# Patient Record
Sex: MALE | Race: BLACK OR AFRICAN AMERICAN | NOT HISPANIC OR LATINO | Employment: OTHER | URBAN - METROPOLITAN AREA
[De-identification: names, ages, dates, MRNs, and addresses within clinical notes are randomized per-mention and may not be internally consistent; named-entity substitution may affect disease eponyms.]

---

## 2024-03-12 DIAGNOSIS — Z12.11 ENCOUNTER FOR SCREENING FOR MALIGNANT NEOPLASM OF COLON: Primary | ICD-10-CM

## 2024-04-01 NOTE — PROGRESS NOTES
Clinic Note    Reason for visit:  The primary encounter diagnosis was Anemia, unspecified type. Diagnoses of HIV infection, unspecified symptom status and Encounter for screening for malignant neoplasm of colon were also pertinent to this visit.    PCP: Tavon Dumont   No address on file    HPI:  This is a 73 y.o. male here to be established. Referred for anemia. He states he had colonoscopy in 2015 at [facility] that was normal. Patient denies any reflux, dysphagia, abdominal pain, constipation, diarrhea, or blood in stool. He has h/o of laryngeal Ca s/p laryngectomy in 2007. He Is able to eat well. He states he has had anemia for years. Was put on iron at one point but no longer is on iron.    2/21/2024 WBC 4.1L Hgb 13.2L, MCV 97.2, CBC, CMP- nl  Patient w/ HIV followed by Dr. Mcdonald on [facility]    Review of Systems   Constitutional:  Negative for diaphoresis, fatigue, fever and unexpected weight change.   HENT:  Negative for hearing loss, mouth sores, postnasal drip, sore throat and trouble swallowing.    Eyes:  Negative for pain, discharge and eye dryness.   Respiratory:  Negative for apnea, cough, choking, chest tightness, shortness of breath and wheezing.    Cardiovascular:  Negative for chest pain, palpitations and leg swelling.   Gastrointestinal:  Negative for abdominal distention, abdominal pain, anal bleeding, blood in stool, change in bowel habit, constipation, diarrhea, nausea, rectal pain, vomiting, reflux and fecal incontinence.   Genitourinary:  Negative for bladder incontinence, dysuria and hematuria.   Musculoskeletal:  Negative for arthralgias, back pain and joint swelling.   Integumentary:  Negative for color change and rash.   Allergic/Immunologic: Negative for environmental allergies and food allergies.   Neurological:  Negative for seizures and headaches.   Hematological:  Negative for adenopathy. Does not bruise/bleed easily.        Past Medical History:   Diagnosis Date    Anemia,  unspecified     B12 deficiency     GERD (gastroesophageal reflux disease)     History of cancer of larynx 2007    History of cerebral infarction     HIV (human immunodeficiency virus infection)     HLD (hyperlipidemia)     HTN (hypertension)     Hypothyroidism, unspecified     Vitamin D deficiency      Past Surgical History:   Procedure Laterality Date    CATARACT EXTRACTION      LARYNGECTOMY  2007     Family History   Problem Relation Age of Onset    Cancer Mother         unsure what kind    Lung cancer Father     Colon cancer Neg Hx     Crohn's disease Neg Hx     Esophageal cancer Neg Hx     Irritable bowel syndrome Neg Hx     Liver cancer Neg Hx     Liver disease Neg Hx     Rectal cancer Neg Hx     Stomach cancer Neg Hx      Social History     Tobacco Use    Smoking status: Never    Smokeless tobacco: Never   Substance Use Topics    Alcohol use: Never    Drug use: Never     Review of patient's allergies indicates:  No Known Allergies     Medication List with Changes/Refills   New Medications    SODIUM,POTASSIUM,MAG SULFATES (SUPREP BOWEL PREP KIT) 17.5-3.13-1.6 GRAM SOLR    Take according to kit instructions but DO NOT eat breakfast the morning of procedure.   Current Medications    AMLODIPINE (NORVASC) 5 MG TABLET    Take 1 tablet by mouth once daily.    ASPIRIN 81 MG CHEW    81 mg.    CYANOCOBALAMIN-COBAMAMIDE 5,000-100 MCG LOZG    Place by sublingual route.    DORZOLAMIDE-TIMOLOL 2-0.5% (COSOPT) 22.3-6.8 MG/ML OPHTHALMIC SOLUTION    INSTILL 1 DROP IN BOTH EYES TWICE A DAY FOR GLAUCOMA    EMTRICITABINE-TENOFOVIR ALAFEN (DESCOVY) 200-25 MG TAB    Take 1 tablet by mouth once daily.    FOLIC ACID (FOLVITE) 1 MG TABLET    Take 1 tablet by mouth once daily.    GABAPENTIN (NEURONTIN) 800 MG TABLET    TK 1 T PO BID    LEVOTHYROXINE (SYNTHROID) 137 MCG TAB TABLET    137 mcg.    LOPINAVIR-RITONAVIR 400-100 MG/5 ML (KALETRA) 400-100 MG/5 ML SOLN    TAKE 5 ML BY MOUTH TWICE A DAY FOR VIRAL INFECTION    METOPROLOL TARTRATE  (LOPRESSOR) 50 MG TABLET    25 mg.    POTASSIUM CHLORIDE SA (K-DUR,KLOR-CON) 20 MEQ TABLET    40 mEq.    PRAVASTATIN (PRAVACHOL) 20 MG TABLET    20 mg.    TERAZOSIN (HYTRIN) 2 MG CAPSULE    4 mg.   Discontinued Medications    CHOLECALCIFEROL, VITAMIN D3, (VITAMIN D3) 50 MCG (2,000 UNIT) TAB    50 mcg.    CLOPIDOGREL BISULFATE (PLAVIX ORAL)            Vital Signs:  /75 (BP Location: Left arm, Patient Position: Sitting)   Pulse 64   Ht 6' (1.829 m)   Wt 83 kg (183 lb)   SpO2 96%   BMI 24.82 kg/m²        Physical Exam  Constitutional:       General: He is not in acute distress.     Appearance: Normal appearance. He is well-developed. He is not ill-appearing or toxic-appearing.      Comments: Ambulates with walker.  Aphonia   HENT:      Head: Normocephalic and atraumatic.      Nose: Nose normal.      Mouth/Throat:      Mouth: Mucous membranes are moist.      Pharynx: Oropharynx is clear. No oropharyngeal exudate or posterior oropharyngeal erythema.   Eyes:      General: Lids are normal. No scleral icterus.        Right eye: No discharge.         Left eye: No discharge.      Conjunctiva/sclera: Conjunctivae normal.   Cardiovascular:      Rate and Rhythm: Normal rate and regular rhythm.      Pulses:           Radial pulses are 2+ on the right side and 2+ on the left side.   Pulmonary:      Effort: Pulmonary effort is normal. No respiratory distress.      Breath sounds: No stridor. No wheezing.   Abdominal:      General: Bowel sounds are normal. There is no distension.      Palpations: Abdomen is soft. There is no fluid wave, hepatomegaly, splenomegaly or mass.      Tenderness: There is no abdominal tenderness. There is no guarding or rebound.   Musculoskeletal:      Cervical back: Full passive range of motion without pain.   Lymphadenopathy:      Cervical: No cervical adenopathy.   Skin:     General: Skin is warm and dry.      Capillary Refill: Capillary refill takes less than 2 seconds.      Coloration: Skin  is not cyanotic, jaundiced or pale.   Neurological:      General: No focal deficit present.      Mental Status: He is alert and oriented to person, place, and time.   Psychiatric:         Mood and Affect: Mood normal.         Behavior: Behavior is cooperative.            All of the data above and below has been reviewed by myself and any further interpretations will be reflected in the assessment and plan.   The data includes review of external notes, and independent interpretation of lab results, procedures, x-rays, and imaging reports.      Assessment:  Anemia, unspecified type  -     Ambulatory Referral to External Surgery  -     CBC Auto Differential; Future; Expected date: 04/02/2024  -     Ferritin; Future; Expected date: 04/02/2024  -     Folate; Future; Expected date: 04/02/2024  -     Hemoglobin Electrophoresis Evaluation, Blood; Future; Expected date: 04/02/2024  -     Iron, TIBC, and %Saturation; Future; Expected date: 04/02/2024  -     Lactate Dehydrogenase; Future; Expected date: 04/02/2024  -     Reticulocytes; Future; Expected date: 04/02/2024  -     Transferrin; Future; Expected date: 04/02/2024  -     sodium,potassium,mag sulfates (SUPREP BOWEL PREP KIT) 17.5-3.13-1.6 gram SolR; Take according to kit instructions but DO NOT eat breakfast the morning of procedure.  Dispense: 1 kit; Refill: 0    HIV infection, unspecified symptom status    Encounter for screening for malignant neoplasm of colon  -     Ambulatory referral/consult to Gastroenterology         Anemia- will complete bloodwork to evalute for iron def. Pt. On folate/b12 currently.  CRC screening- due in 2025. Referral for anemia to have colonoscopy earlier due to anemia.     Recommendations:    Schedule colonoscopy with Dr. Dickens  Submit bloodwork.    Risks, benefits, and alternatives of medical management, any associated procedures, and/or treatment discussed with the patient. Patient given opportunity to ask questions and voices  understanding. Patient has elected to proceed with the recommended care modalities as discussed.    Follow up if symptoms worsen or fail to improve.    Order summary:  Orders Placed This Encounter   Procedures    CBC Auto Differential    Ferritin    Folate    Hemoglobin Electrophoresis Evaluation, Blood    Iron, TIBC, and %Saturation    Lactate Dehydrogenase    Reticulocytes    Transferrin    Ambulatory Referral to External Surgery        Instructed patient to notify my office if they have not been contacted within two weeks after any procedures, submitting any samples (biopsies, blood, stool, urine, etc.) or after any imaging (X-ray, CT, MRI, etc.).      Quyen Mcdowell NP    This document may have been created using a voice recognition transcribing system. Incorrect words or phrases may have been missed during proofreading. Please interpret accordingly or contact me for clarification.

## 2024-04-02 ENCOUNTER — OFFICE VISIT (OUTPATIENT)
Dept: GASTROENTEROLOGY | Facility: CLINIC | Age: 74
End: 2024-04-02
Payer: OTHER GOVERNMENT

## 2024-04-02 ENCOUNTER — TELEPHONE (OUTPATIENT)
Dept: GASTROENTEROLOGY | Facility: CLINIC | Age: 74
End: 2024-04-02

## 2024-04-02 VITALS
SYSTOLIC BLOOD PRESSURE: 114 MMHG | OXYGEN SATURATION: 96 % | HEIGHT: 72 IN | BODY MASS INDEX: 24.79 KG/M2 | DIASTOLIC BLOOD PRESSURE: 75 MMHG | WEIGHT: 183 LBS | HEART RATE: 64 BPM

## 2024-04-02 DIAGNOSIS — D64.9 ANEMIA, UNSPECIFIED TYPE: Primary | ICD-10-CM

## 2024-04-02 DIAGNOSIS — B20 HIV INFECTION, UNSPECIFIED SYMPTOM STATUS: ICD-10-CM

## 2024-04-02 DIAGNOSIS — Z12.11 ENCOUNTER FOR SCREENING FOR MALIGNANT NEOPLASM OF COLON: ICD-10-CM

## 2024-04-02 DIAGNOSIS — D64.9 ANEMIA, UNSPECIFIED TYPE: ICD-10-CM

## 2024-04-02 PROCEDURE — 99204 OFFICE O/P NEW MOD 45 MIN: CPT | Mod: S$GLB,,, | Performed by: NURSE PRACTITIONER

## 2024-04-02 RX ORDER — METOPROLOL TARTRATE 50 MG/1
25 TABLET ORAL
COMMUNITY
Start: 2024-01-18

## 2024-04-02 RX ORDER — CHOLECALCIFEROL (VITAMIN D3) 50 MCG
50 TABLET ORAL
COMMUNITY
Start: 2024-02-28 | End: 2024-04-02

## 2024-04-02 RX ORDER — PRAVASTATIN SODIUM 20 MG/1
20 TABLET ORAL
COMMUNITY
Start: 2023-12-14

## 2024-04-02 RX ORDER — NAPROXEN SODIUM 220 MG/1
81 TABLET, FILM COATED ORAL
COMMUNITY
Start: 2024-02-28

## 2024-04-02 RX ORDER — DORZOLAMIDE HYDROCHLORIDE AND TIMOLOL MALEATE 20; 5 MG/ML; MG/ML
SOLUTION/ DROPS OPHTHALMIC
COMMUNITY
Start: 2023-08-31

## 2024-04-02 RX ORDER — GABAPENTIN 800 MG/1
TABLET ORAL
COMMUNITY

## 2024-04-02 RX ORDER — LEVOTHYROXINE SODIUM 137 UG/1
137 TABLET ORAL
COMMUNITY
Start: 2024-02-28

## 2024-04-02 RX ORDER — SODIUM, POTASSIUM,MAG SULFATES 17.5-3.13G
SOLUTION, RECONSTITUTED, ORAL ORAL
Qty: 1 KIT | Refills: 0 | Status: SHIPPED | OUTPATIENT
Start: 2024-04-02 | End: 2024-04-15 | Stop reason: SDUPTHER

## 2024-04-02 RX ORDER — FOLIC ACID 1 MG/1
1 TABLET ORAL DAILY
COMMUNITY
Start: 2024-02-28

## 2024-04-02 RX ORDER — POTASSIUM CHLORIDE 20 MEQ/1
40 TABLET, EXTENDED RELEASE ORAL
COMMUNITY
Start: 2024-02-28

## 2024-04-02 RX ORDER — AMLODIPINE BESYLATE 5 MG/1
1 TABLET ORAL DAILY
COMMUNITY
Start: 2024-02-28

## 2024-04-02 RX ORDER — SOD SULF/POT CHLORIDE/MAG SULF 1.479 G
TABLET ORAL
Qty: 24 TABLET | Refills: 0 | Status: CANCELLED | OUTPATIENT
Start: 2024-04-02

## 2024-04-02 RX ORDER — TERAZOSIN 2 MG/1
4 CAPSULE ORAL
COMMUNITY
Start: 2024-01-18

## 2024-04-02 RX ORDER — LOPINAVIR-RITONAVIR 80; 20 MG/ML; MG/ML
SOLUTION ORAL
COMMUNITY
Start: 2023-12-14

## 2024-04-02 NOTE — PATIENT INSTRUCTIONS
Schedule colonoscopy with Dr. Dickens  Submit bloodwork.    Please notify my office if you have not been contacted within two weeks after any procedures, submitting any samples (biopsies, blood, stool, urine, etc.) or after any imaging (X-ray, CT, MRI, etc.).

## 2024-04-02 NOTE — TELEPHONE ENCOUNTER
----- Message from Reny Angeles sent at 4/2/2024 11:18 AM CDT -----  Contact: Nayeli(wife)  Nayeli called to consult with nurse or staff regarding the patient. She states she wanted to notify the office the order for the prep for [facility]. She wanted to see if this can be mailed to them. She would like a call back regarding this and can be reached at [phone]. Thanks/MR

## 2024-04-02 NOTE — TELEPHONE ENCOUNTER
Spoke with patient's wife. She said that they need the prep medication to [facility] the [facility]. I asked for pharmacy information and she could not advise. She advised to just fax it back to the person from the [facility] who [facility] the referral and they will take care of it. That way it will be mailed to patient. DMP

## 2024-04-15 RX ORDER — SODIUM, POTASSIUM,MAG SULFATES 17.5-3.13G
SOLUTION, RECONSTITUTED, ORAL ORAL
Qty: 1 KIT | Refills: 0 | Status: SHIPPED | OUTPATIENT
Start: 2024-04-15

## 2024-08-01 ENCOUNTER — TELEPHONE (OUTPATIENT)
Dept: GASTROENTEROLOGY | Facility: CLINIC | Age: 74
End: 2024-08-01
Payer: OTHER GOVERNMENT

## 2024-08-01 VITALS — BODY MASS INDEX: 24.79 KG/M2 | HEIGHT: 72 IN | WEIGHT: 183 LBS

## 2024-08-01 DIAGNOSIS — D64.9 ANEMIA, UNSPECIFIED TYPE: Primary | ICD-10-CM

## 2024-08-01 NOTE — TELEPHONE ENCOUNTER
"S/w pt's wife and told her that I was calling as a courtesy regarding up coming Colon with NBP on 8/8/24, Thurs and wanted to verify that he has his paper prep instructions and meds. Pt's wife stated she has the instruction but just needs to pickup  the meds.  I also mentioned that [facility] will call the day before (WED) with the arrival time, GI Lab is located on the third floor, and to pre-register before next Wed. [facility]      There is a red bar across "SnapShot" that states pt is a neck breather and may only intubated/ventilated through the neck stoma. Call ENT with Questions regarding airway management. kj  "

## 2024-08-01 NOTE — TELEPHONE ENCOUNTER
"[place] [facility]  [address] [place] [address]  Phone: [phone]  Fax: [phone]    History & Physical         Provider: Dr. Tonja Dickens    Patient Name: Ky LEMUS (age):1950  74 y.o.           Gender: male   Phone: [phone]     Referring Physician: Tavon Dumont     Vital Signs:   Height - 6' 0"  Weight - 183 lb  BMI -  24.82    Plan: Colonoscopy @ [facility]    Encounter Diagnosis   Name Primary?    Anemia, unspecified type Yes           History:      Past Medical History:   Diagnosis Date    Anemia, unspecified     B12 deficiency     GERD (gastroesophageal reflux disease)     History of cancer of larynx     History of cerebral infarction     HIV (human immunodeficiency virus infection)     HLD (hyperlipidemia)     HTN (hypertension)     Hypothyroidism, unspecified     Vitamin D deficiency       Past Surgical History:   Procedure Laterality Date    CATARACT EXTRACTION      LARYNGECTOMY        Medication List with Changes/Refills   Current Medications    AMLODIPINE (NORVASC) 5 MG TABLET    Take 1 tablet by mouth once daily.    ASPIRIN 81 MG CHEW    81 mg.    CYANOCOBALAMIN-COBAMAMIDE 5,000-100 MCG LOZG    Place by sublingual route.    DORZOLAMIDE-TIMOLOL 2-0.5% (COSOPT) 22.3-6.8 MG/ML OPHTHALMIC SOLUTION    INSTILL 1 DROP IN BOTH EYES TWICE A DAY FOR GLAUCOMA    EMTRICITABINE-TENOFOVIR ALAFEN (DESCOVY) 200-25 MG TAB    Take 1 tablet by mouth once daily.    FOLIC ACID (FOLVITE) 1 MG TABLET    Take 1 tablet by mouth once daily.    GABAPENTIN (NEURONTIN) 800 MG TABLET    TK 1 T PO BID    LEVOTHYROXINE (SYNTHROID) 137 MCG TAB TABLET    137 mcg.    LOPINAVIR-RITONAVIR 400-100 MG/5 ML (KALETRA) 400-100 MG/5 ML SOLN    TAKE 5 ML BY MOUTH TWICE A DAY FOR VIRAL INFECTION    METOPROLOL TARTRATE (LOPRESSOR) 50 MG TABLET    25 mg.    POTASSIUM CHLORIDE SA (K-DUR,KLOR-CON) 20 MEQ TABLET    40 " mEq.    PRAVASTATIN (PRAVACHOL) 20 MG TABLET    20 mg.    SODIUM,POTASSIUM,MAG SULFATES (SUPREP BOWEL PREP KIT) 17.5-3.13-1.6 GRAM SOLR    Take according to kit instructions but DO NOT eat breakfast the morning of procedure.    TERAZOSIN (HYTRIN) 2 MG CAPSULE    4 mg.      Review of patient's allergies indicates:  No Known Allergies   Family History   Problem Relation Name Age of Onset    Cancer Mother          unsure what kind    Lung cancer Father      Colon cancer Neg Hx      Crohn's disease Neg Hx      Esophageal cancer Neg Hx      Irritable bowel syndrome Neg Hx      Liver cancer Neg Hx      Liver disease Neg Hx      Rectal cancer Neg Hx      Stomach cancer Neg Hx        Social History     Tobacco Use    Smoking status: Never    Smokeless tobacco: Never   Substance Use Topics    Alcohol use: Never    Drug use: Never        Physical Examination:     General Appearance:___________________________  HEENT: _____________________________________  Abdomen:____________________________________  Heart:________________________________________  Lungs:_______________________________________  Extremities:___________________________________  Skin:_________________________________________  Endocrine:____________________________________  Genitourinary:_________________________________  Neurological:__________________________________      Patient has been evaluated immediately prior to sedation and is medically cleared for endoscopy with IVCS as an ASA class: ______      Physician Signature: _________________________       Date: ________  Time: ________

## 2024-08-07 ENCOUNTER — TELEPHONE (OUTPATIENT)
Dept: GASTROENTEROLOGY | Facility: CLINIC | Age: 74
End: 2024-08-07
Payer: OTHER GOVERNMENT

## 2024-08-08 ENCOUNTER — OUTSIDE PLACE OF SERVICE (OUTPATIENT)
Dept: GASTROENTEROLOGY | Facility: CLINIC | Age: 74
End: 2024-08-08

## 2024-08-08 LAB — CRC RECOMMENDATION EXT: NORMAL

## 2024-08-16 ENCOUNTER — TELEPHONE (OUTPATIENT)
Dept: GASTROENTEROLOGY | Facility: CLINIC | Age: 74
End: 2024-08-16
Payer: OTHER GOVERNMENT

## 2024-08-16 VITALS — WEIGHT: 183 LBS | HEIGHT: 72 IN | BODY MASS INDEX: 24.79 KG/M2

## 2024-08-16 DIAGNOSIS — C18.7 ADENOCARCINOMA OF SIGMOID COLON: Primary | ICD-10-CM

## 2024-08-16 NOTE — TELEPHONE ENCOUNTER
----- Message from Tonja Dickens MD sent at 8/16/2024  5:43 AM CDT -----  Regarding: trach reese Napier, this path was unexpected. I need to go back in and tattoo this sigmoid site if I can find it before it heals 100%.  Please see if you can communicate with him over the phone or just have him come in sometime today if he prefers to review results and discuss why repeat colon ASAP.  Add him on to Monday procedure schedule if PA not needed.    Polly, do we need a PA to have a repeat colonoscopy Monday? If so, can we request STAT and get PA today?  NBP  ----- Message -----  From: Homar Herndon MA  Sent: 8/14/2024   4:21 PM CDT  To: Tonja Dickens MD    Patient had colored picture on his path report. 8/14/24 LRA

## 2024-08-16 NOTE — TELEPHONE ENCOUNTER
"[place] [facility]  [address] [place] [address]  Phone: [phone]  Fax: [phone]    History & Physical         Provider: Dr. Tonja Dickens    Patient Name: Ky LEMUS (age):1950  74 y.o.           Gender: male   Phone: [phone]     Referring Physician: Tavon Dumont     Vital Signs:   Height - 6' 0"  Weight - 183 lb  BMI -  24.82    Plan: Colonoscopy w/ tattoo of sigmoid polypectomy site @ [facility]    Encounter Diagnosis   Name Primary?    Adenocarcinoma of sigmoid colon Yes           History:      Past Medical History:   Diagnosis Date    Anemia, unspecified     B12 deficiency     GERD (gastroesophageal reflux disease)     History of cancer of larynx     History of cerebral infarction     HIV (human immunodeficiency virus infection)     HLD (hyperlipidemia)     HTN (hypertension)     Hypothyroidism, unspecified     Vitamin D deficiency       Past Surgical History:   Procedure Laterality Date    CATARACT EXTRACTION      LARYNGECTOMY        Medication List with Changes/Refills   Current Medications    AMLODIPINE (NORVASC) 5 MG TABLET    Take 1 tablet by mouth once daily.    ASPIRIN 81 MG CHEW    81 mg.    CYANOCOBALAMIN-COBAMAMIDE 5,000-100 MCG LOZG    Place by sublingual route.    DORZOLAMIDE-TIMOLOL 2-0.5% (COSOPT) 22.3-6.8 MG/ML OPHTHALMIC SOLUTION    INSTILL 1 DROP IN BOTH EYES TWICE A DAY FOR GLAUCOMA    EMTRICITABINE-TENOFOVIR ALAFEN (DESCOVY) 200-25 MG TAB    Take 1 tablet by mouth once daily.    FOLIC ACID (FOLVITE) 1 MG TABLET    Take 1 tablet by mouth once daily.    GABAPENTIN (NEURONTIN) 800 MG TABLET    TK 1 T PO BID    LEVOTHYROXINE (SYNTHROID) 137 MCG TAB TABLET    137 mcg.    LOPINAVIR-RITONAVIR 400-100 MG/5 ML (KALETRA) 400-100 MG/5 ML SOLN    TAKE 5 ML BY MOUTH TWICE A DAY FOR VIRAL INFECTION    METOPROLOL TARTRATE (LOPRESSOR) 50 MG TABLET    25 mg.    POTASSIUM " CHLORIDE SA (K-DUR,KLOR-CON) 20 MEQ TABLET    40 mEq.    PRAVASTATIN (PRAVACHOL) 20 MG TABLET    20 mg.    SODIUM,POTASSIUM,MAG SULFATES (SUPREP BOWEL PREP KIT) 17.5-3.13-1.6 GRAM SOLR    Take according to kit instructions but DO NOT eat breakfast the morning of procedure.    TERAZOSIN (HYTRIN) 2 MG CAPSULE    4 mg.      Review of patient's allergies indicates:  No Known Allergies   Family History   Problem Relation Name Age of Onset    Cancer Mother          unsure what kind    Lung cancer Father      Colon cancer Neg Hx      Crohn's disease Neg Hx      Esophageal cancer Neg Hx      Irritable bowel syndrome Neg Hx      Liver cancer Neg Hx      Liver disease Neg Hx      Rectal cancer Neg Hx      Stomach cancer Neg Hx        Social History     Tobacco Use    Smoking status: Never    Smokeless tobacco: Never   Substance Use Topics    Alcohol use: Never    Drug use: Never        Physical Examination:     General Appearance:___________________________  HEENT: _____________________________________  Abdomen:____________________________________  Heart:________________________________________  Lungs:_______________________________________  Extremities:___________________________________  Skin:_________________________________________  Endocrine:____________________________________  Genitourinary:_________________________________  Neurological:__________________________________      Patient has been evaluated immediately prior to sedation and is medically cleared for endoscopy with IVCS as an ASA class: ______      Physician Signature: _________________________       Date: ________  Time: ________

## 2024-08-16 NOTE — TELEPHONE ENCOUNTER
Spoke with patient wife, Nayeli, (patient is aphasic) but was sitting next to her. Gave pathology results and plan of care. Agreed to Colonoscopy on Monday with Dr. Dickens. Will come by to  sample of Suflave.     Will need to send copy to PCP/Tamara once final report is resulted.  Patient wife will be here this morning to  sample.  Please fax order and notify GI lab (let them know patient does not talk due to laryngectomy so will talk to wife (Nayeli).    Order signed.  Kn

## 2024-08-16 NOTE — TELEPHONE ENCOUNTER
Updated Epic schedule and notified GI Lab of added on. To 8/19/24. PA # [id number], exp 11/6/24. Prepared SuFlave sample ready for pickup and paper prep instructions. jono

## 2024-08-19 ENCOUNTER — OUTSIDE PLACE OF SERVICE (OUTPATIENT)
Dept: GASTROENTEROLOGY | Facility: CLINIC | Age: 74
End: 2024-08-19

## 2024-08-19 LAB
CRC RECOMMENDATION EXT: NORMAL
CRC RECOMMENDATION EXT: NORMAL

## 2024-08-20 ENCOUNTER — TELEPHONE (OUTPATIENT)
Dept: GASTROENTEROLOGY | Facility: CLINIC | Age: 74
End: 2024-08-20
Payer: OTHER GOVERNMENT

## 2024-08-20 NOTE — TELEPHONE ENCOUNTER
8/20/2024 RBC 3.9L, Hgb 12.4L MCV 96H Glucose 129H, Cr 1.34H, GFR 55.9; CBC, CMP, CEA, INR-nl    Call with results. His bloodwork looked overall well. He has anemia but seems to be stable. Tumor marker was negative. They should be contacted him to schedule his CT scans. He should increase his water intake to ensure proper hydration prior to and after the CT scan due to the contrast given.   ELDA

## 2024-08-21 NOTE — TELEPHONE ENCOUNTER
Called patient; No answer, Unable to leave a VM the box was full. Wanted to give lab results per [facility]  -SALLIE, LPN

## 2024-08-22 NOTE — TELEPHONE ENCOUNTER
2nd attempt. Called patient, No answer, phone just continued to ring. Wanted to inform patient about CT and give lab results per [facility]. -SALLIE,LPN

## 2024-08-23 NOTE — TELEPHONE ENCOUNTER
Spoke with patient and gave results of bloodwork and informed him that the providers have ordered him a  CT. Patient was given the recommendation to increase his water intake to ensure proper hydration prior to and after his CT scan due to the contrast given.    Patient was informed that per Heather, the order has already been faxed to the hospital and someone should be calling to schedule.     Patient verbalized understanding of this information.  -SALLIE, LPN

## 2024-08-29 NOTE — TELEPHONE ENCOUNTER
8-20-24 MRN [id number] Luis Both CT's approved REF# [id number]     Staff faxed CT Chest to central scheduling w/approval.. abilio

## 2024-09-05 ENCOUNTER — TELEPHONE (OUTPATIENT)
Dept: GASTROENTEROLOGY | Facility: CLINIC | Age: 74
End: 2024-09-05
Payer: OTHER GOVERNMENT

## 2024-09-05 DIAGNOSIS — C18.7 ADENOCARCINOMA OF SIGMOID COLON: Primary | ICD-10-CM

## 2024-09-05 NOTE — TELEPHONE ENCOUNTER
Called gave CT results to wife. She voiced understanding. Referral, OV notes, both colonoscopy reports and all path reports were faxed to Dr. Pena's office at [phone]. DMP

## 2024-09-05 NOTE — TELEPHONE ENCOUNTER
9/4/2024 CT Chest wo: normal; tracheostomy with uncomplicated appearance.    Call with results. His CT of chest was normal.   I signed referral to Dr. Pena. It was signed as internal instead of external on 8/28/2024. Please fax to Dr. Pena. Marked as urgent.  Send with both colonoscopies, path, OV.  KN

## 2024-09-10 ENCOUNTER — DOCUMENTATION ONLY (OUTPATIENT)
Dept: GASTROENTEROLOGY | Facility: CLINIC | Age: 74
End: 2024-09-10
Payer: OTHER GOVERNMENT

## 2024-09-12 ENCOUNTER — DOCUMENTATION ONLY (OUTPATIENT)
Dept: GASTROENTEROLOGY | Facility: CLINIC | Age: 74
End: 2024-09-12
Payer: OTHER GOVERNMENT

## 2024-09-18 ENCOUNTER — TELEPHONE (OUTPATIENT)
Dept: GASTROENTEROLOGY | Facility: CLINIC | Age: 74
End: 2024-09-18
Payer: OTHER GOVERNMENT

## 2024-09-18 NOTE — TELEPHONE ENCOUNTER
----- Message from Deepika Lomeli sent at 9/16/2024  3:41 PM CDT -----  Contact: pt wife  Pt wife Nayeli  returning a missed call about results and she can be reached at [phone]    Thanks,

## 2024-09-19 ENCOUNTER — TELEPHONE (OUTPATIENT)
Dept: ADMINISTRATIVE | Facility: CLINIC | Age: 74
End: 2024-09-19
Payer: OTHER GOVERNMENT

## 2024-09-19 ENCOUNTER — TELEPHONE (OUTPATIENT)
Dept: GASTROENTEROLOGY | Facility: CLINIC | Age: 74
End: 2024-09-19
Payer: OTHER GOVERNMENT

## 2024-09-19 NOTE — TELEPHONE ENCOUNTER
Dr. Pnea office called stating patient has [facility] insurance and they will need a referral from VA prior to being able to schedule patient for appointment. Will make connection with [facility] to see if referral can be sent to Dr. Pena's office for appointment. BF

## 2024-09-19 NOTE — TELEPHONE ENCOUNTER
----- Message from Quyen Castillo sent at 9/19/2024  8:21 AM CDT -----  Contact: Dr Ankit Monaco  ..Type:  Patient Requesting Call    Who Called: Dr Ankit Monaco   Does the patient know what this is regarding?: referral - needs to speak w/someone regarding insurance  Would the patient rather a call back or a response via [facility]?  call  Best Call Back Number: [phone]  Additional Information:

## 2024-10-09 ENCOUNTER — TELEPHONE (OUTPATIENT)
Dept: GASTROENTEROLOGY | Facility: CLINIC | Age: 74
End: 2024-10-09

## 2024-10-09 NOTE — TELEPHONE ENCOUNTER
Polly, is there a way to find out if the referral to Becky was approved by the [facility]? This is a cancer case and I am trying to help facilitate his care.  BRYON

## 2024-12-03 NOTE — TELEPHONE ENCOUNTER
Becky reported to me directly that records were sent to me again today.  Check fax queue and let me know if not present.  NBP

## 2024-12-14 ENCOUNTER — TELEPHONE (OUTPATIENT)
Dept: GASTROENTEROLOGY | Facility: CLINIC | Age: 74
End: 2024-12-14
Payer: OTHER GOVERNMENT

## 2024-12-14 NOTE — TELEPHONE ENCOUNTER
11/26/2024 Eliecer OV: Discussed at tumor board and surveillance is rec.  11/17/2024 robotic sigmoidectomy. No residual cancer. 0/18 LN. pT0 pN0.  8/8/2024: corrected colonoscopy path report: sigmoid polyp: adenoCA, no PNI/LVI, no MSI.    Notify patient/spouse that I reviewed his records from Dr. Pena.  Plan for repeat colonoscopy 8/2025. Schedule colonoscopy and NP OV 3 weeks prior to that OV.  NBP

## 2024-12-16 NOTE — TELEPHONE ENCOUNTER
S/W pts wife and informed her NBP would like to see Pt bck for a repeat Colonoscopy 8/8/25 & 3 weeks prior a O/V visit with the NP RYAN 7/16/25 at 9 A.M -MAGDALENE

## 2025-08-07 ENCOUNTER — TELEPHONE (OUTPATIENT)
Dept: GASTROENTEROLOGY | Facility: CLINIC | Age: 75
End: 2025-08-07
Payer: OTHER GOVERNMENT

## 2025-08-25 ENCOUNTER — TELEPHONE (OUTPATIENT)
Dept: GASTROENTEROLOGY | Facility: CLINIC | Age: 75
End: 2025-08-25
Payer: OTHER GOVERNMENT

## 2025-08-26 ENCOUNTER — TELEPHONE (OUTPATIENT)
Dept: GASTROENTEROLOGY | Facility: CLINIC | Age: 75
End: 2025-08-26
Payer: OTHER GOVERNMENT

## 2025-08-27 ENCOUNTER — TELEPHONE (OUTPATIENT)
Dept: GASTROENTEROLOGY | Facility: CLINIC | Age: 75
End: 2025-08-27
Payer: OTHER GOVERNMENT